# Patient Record
Sex: MALE | Race: WHITE | NOT HISPANIC OR LATINO | Employment: FULL TIME | URBAN - METROPOLITAN AREA
[De-identification: names, ages, dates, MRNs, and addresses within clinical notes are randomized per-mention and may not be internally consistent; named-entity substitution may affect disease eponyms.]

---

## 2020-07-04 ENCOUNTER — OFFICE VISIT (OUTPATIENT)
Dept: URGENT CARE | Facility: CLINIC | Age: 40
End: 2020-07-04
Payer: OTHER GOVERNMENT

## 2020-07-04 VITALS — DIASTOLIC BLOOD PRESSURE: 85 MMHG | OXYGEN SATURATION: 99 % | HEART RATE: 62 BPM | SYSTOLIC BLOOD PRESSURE: 120 MMHG

## 2020-07-04 DIAGNOSIS — R05.9 COUGH: ICD-10-CM

## 2020-07-04 DIAGNOSIS — B34.9 VIRAL SYNDROME: Primary | ICD-10-CM

## 2020-07-04 DIAGNOSIS — R06.02 SHORTNESS OF BREATH: ICD-10-CM

## 2020-07-04 DIAGNOSIS — J02.9 SORE THROAT: ICD-10-CM

## 2020-07-04 PROCEDURE — 99214 OFFICE O/P EST MOD 30 MIN: CPT | Mod: S$GLB,,, | Performed by: NURSE PRACTITIONER

## 2020-07-04 PROCEDURE — U0003 INFECTIOUS AGENT DETECTION BY NUCLEIC ACID (DNA OR RNA); SEVERE ACUTE RESPIRATORY SYNDROME CORONAVIRUS 2 (SARS-COV-2) (CORONAVIRUS DISEASE [COVID-19]), AMPLIFIED PROBE TECHNIQUE, MAKING USE OF HIGH THROUGHPUT TECHNOLOGIES AS DESCRIBED BY CMS-2020-01-R: HCPCS

## 2020-07-04 PROCEDURE — 99214 PR OFFICE/OUTPT VISIT, EST, LEVL IV, 30-39 MIN: ICD-10-PCS | Mod: S$GLB,,, | Performed by: NURSE PRACTITIONER

## 2020-07-04 RX ORDER — ALBUTEROL SULFATE 90 UG/1
2 AEROSOL, METERED RESPIRATORY (INHALATION) EVERY 6 HOURS PRN
Qty: 1 G | Refills: 0 | Status: SHIPPED | OUTPATIENT
Start: 2020-07-04 | End: 2020-07-11

## 2020-07-04 RX ORDER — BENZONATATE 100 MG/1
100 CAPSULE ORAL 3 TIMES DAILY PRN
Qty: 21 CAPSULE | Refills: 0 | Status: SHIPPED | OUTPATIENT
Start: 2020-07-04 | End: 2020-07-11

## 2020-07-04 RX ORDER — PROMETHAZINE HYDROCHLORIDE AND DEXTROMETHORPHAN HYDROBROMIDE 6.25; 15 MG/5ML; MG/5ML
5 SYRUP ORAL EVERY 6 HOURS PRN
Qty: 100 ML | Refills: 0 | Status: SHIPPED | OUTPATIENT
Start: 2020-07-04 | End: 2020-07-11

## 2020-07-04 NOTE — LETTER
July 4, 2020      [facility] [address], [address]  [place] [address]  Phone: [phone]  Fax: [phone]       Patient: Juma Cloud   YOB: 1980  Date of Visit: 07/04/2020    To Whom It May Concern:    Carolin Cloud  was at [facility] on 07/04/2020. The patient is being tested for COVID-19. If he/she is negative, he/she may return to work/school when he/she is symptom free without fever reducing meds in 24 hours-with a surgical mask for 10 days. If the patient is positive, he/she can return to work three days after improved symptoms and no fever without fever reducing meds - the patient has to be out for a minimum of 7 days. We will reswab any employee WHO HAS PREVIOUSLY TESTED POSITIVE FOR COVID, and their employer is requiring a negative test for them to return to work; it must be at least 90 days from your last test. If you have any questions or concerns, or if I can be of further assistance, please do not hesitate to contact me.    Sincerely,    Lila Ott NP

## 2020-07-04 NOTE — PROGRESS NOTES
Subjective:       Patient ID: Juma Cloud is a 39 y.o. male.    Vitals:  blood pressure is 120/85 and his pulse is 62. His oxygen saturation is 99%.     Chief Complaint: COVID-19 Concerns    Patient hasnt been feeling well since yesterday..scratchy throat, slight SOB, and a light cough.    Sore Throat   This is a new problem. The current episode started yesterday. The problem has been unchanged. Neither side of throat is experiencing more pain than the other. There has been no fever. The pain is at a severity of 0/10. The patient is experiencing no pain. Associated symptoms include coughing and shortness of breath. Pertinent negatives include no abdominal pain, congestion, diarrhea, drooling, ear discharge, ear pain, headaches, hoarse voice, plugged ear sensation, neck pain, stridor, swollen glands, trouble swallowing or vomiting. He has had no exposure to strep or mono. He has tried nothing for the symptoms. The treatment provided no relief.       Constitution: Negative for chills, fatigue and fever.   HENT: Positive for sore throat. Negative for ear pain, ear discharge, drooling, congestion and trouble swallowing.    Neck: Negative for neck pain and painful lymph nodes.   Cardiovascular: Negative for chest pain and leg swelling.   Eyes: Negative for double vision and blurred vision.   Respiratory: Positive for cough and shortness of breath. Negative for stridor.    Gastrointestinal: Negative for abdominal pain, nausea, vomiting and diarrhea.   Genitourinary: Negative for dysuria, frequency and urgency.   Musculoskeletal: Negative for joint pain, joint swelling, muscle cramps and muscle ache.   Skin: Negative for color change, pale and rash.   Allergic/Immunologic: Negative for seasonal allergies.   Neurological: Negative for dizziness, history of vertigo, light-headedness, passing out and headaches.   Hematologic/Lymphatic: Negative for swollen lymph nodes, easy bruising/bleeding and history of blood clots.  Does not bruise/bleed easily.   Psychiatric/Behavioral: Negative for nervous/anxious, sleep disturbance and depression. The patient is not nervous/anxious.        Objective:      Physical Exam   Constitutional: He is oriented to person, place, and time. He appears well-developed. He is cooperative.  Non-toxic appearance. He does not appear ill. No distress.   HENT:   Head: Normocephalic and atraumatic.   Right Ear: Hearing, external ear and ear canal normal. A middle ear effusion is present.   Left Ear: Hearing, external ear and ear canal normal. A middle ear effusion is present.   Nose: Nose normal. No mucosal edema, rhinorrhea or nasal deformity. No epistaxis. Right sinus exhibits no maxillary sinus tenderness and no frontal sinus tenderness. Left sinus exhibits no maxillary sinus tenderness and no frontal sinus tenderness.   Mouth/Throat: Uvula is midline, oropharynx is clear and moist and mucous membranes are normal. No trismus in the jaw. Normal dentition. No uvula swelling. No posterior oropharyngeal erythema.   Eyes: Conjunctivae and lids are normal. Right eye exhibits no discharge. Left eye exhibits no discharge. No scleral icterus.   Neck: Trachea normal, normal range of motion, full passive range of motion without pain and phonation normal. Neck supple.   Cardiovascular: Normal rate, regular rhythm, normal heart sounds and normal pulses.   Pulmonary/Chest: Effort normal and breath sounds normal. No respiratory distress.   Abdominal: Soft. Normal appearance and bowel sounds are normal. He exhibits no distension, no pulsatile midline mass and no mass. There is no abdominal tenderness.   Musculoskeletal: Normal range of motion.         General: No deformity.   Neurological: He is alert and oriented to person, place, and time. He exhibits normal muscle tone. Coordination normal.   Skin: Skin is warm, dry, intact, not diaphoretic and not pale.   Psychiatric: His speech is normal and behavior is normal. Judgment  and thought content normal.   Nursing note and vitals reviewed.        Assessment:       1. Viral syndrome    2. Cough    3. Sore throat    4. Shortness of breath        Plan:         Viral syndrome    Cough  -     COVID-19 Routine Screening  -     Airborne and Contact and Droplet Isolation Status; Standing  -     benzonatate (TESSALON) 100 MG capsule; Take 1 capsule (100 mg total) by mouth 3 (three) times daily as needed for Cough.  Dispense: 21 capsule; Refill: 0  -     promethazine-dextromethorphan (PROMETHAZINE-DM) 6.25-15 mg/5 mL Syrp; Take 5 mLs by mouth every 6 (six) hours as needed (May take every 4 hours, PRN. DoNOT take more than 30 mL in 24 hours.).  Dispense: 100 mL; Refill: 0  -     albuterol (PROVENTIL HFA) 90 mcg/actuation inhaler; Inhale 2 puffs into the lungs every 6 (six) hours as needed for Wheezing. Rescue  Dispense: 1 g; Refill: 0    Sore throat  -     Cancel: POCT Strep A, Molecular    Shortness of breath          Patient Instructions     Always follow your healthcare professional's instructions.    You have received urgent care diagnosis and treatment and you may be released before all of your medical problems are known or treated. Unless you have been given a referral, you (the patient), will arrange for follow-up care as instructed.     If you have been given a referral, [facility] will contact you (their phone number is [phone]). If they do NOT call you by the end of the business day, please call them the following day.    You have been diagnosed with Viral Syndrome. TREATMENT: YOUR TREATMENT IS AIMED AT SYMPTOM MANAGEMENT.       A viral illness may cause a number of symptoms. The symptoms depend on the part of the body that the virus affects. If it settles in your nose, throat, and lungs, it may cause cough, sore throat, congestion, and sometimes headache. If it settles in your stomach and intestinal tract, it may cause vomiting and diarrhea. Sometimes it causes vague symptoms like  ""aching all over," feeling tired, loss of appetite, or fever. A viral illness usually lasts 1 to 2 weeks, but sometimes it lasts longer. In some cases, a more serious infection can look like a viral syndrome in the first few days of the illness; therefore, you may need another exam and additional tests to know the difference.          Home care  Follow these guidelines for taking care of yourself at home:  · If symptoms are severe, rest at home for the first 2 to 3 days.  · Stay away from cigarette smoke - both your smoke and the smoke from others.  · You may use over-the-counter acetaminophen or ibuprofen for fever, muscle aching, and headache, unless another medicine was prescribed for this. If you have chronic liver or kidney disease or ever had a stomach ulcer or GI bleeding, talk with your doctor before using these medicines. No one who is younger than 18 and ill with a fever should take aspirin. It may cause severe disease or death.  · Your appetite may be poor, so a light diet is fine. Avoid dehydration by drinking 8 to 12 8-ounce glasses of fluids each day. This may include water; orange juice; lemonade; apple, grape, and cranberry juice; clear fruit drinks; electrolyte replacement and sports drinks; and decaffeinated teas and coffee. If you have been diagnosed with a kidney disease, ask your doctor how much and what types of fluids you should drink to prevent dehydration. If you have kidney disease, drinking too much fluid can cause it build up in the your body and be dangerous to your health.  · Over-the-counter remedies won't shorten the length of the illness but may be helpful for cough, sore throat; and nasal and sinus congestion. Don't use decongestants if you have high blood pressure.    Follow-up care  Follow up with your healthcare provider if your symptoms continue for more than 7 days.    Fever Cough Body Aches Nausea and Vomiting Diarrhea Congestion or Runny Nose    OTC Tylenol   OTC NSAIDs " " Tessalon Pearls   Phenergan DM   OTC cough medications  Mobic   OTC Tylenol   OTC NSAIDs  Zofran   Phenergan   OTC Emetrol  DO NOT take ant-diarrheal medications  OTC Claritin, Zyrtec, Allegra, OR Xyzal   OTC Flonase   OTC Benadryl      Cough Allergy Symptoms Asthma Headache or Body Aches   Tessalon Perles are a non-narcotic cough medicine. It works by numbing the throat and lungs, making the cough reflex less active, it is used to relieve coughing during the day. If you have been given Phenergan DM cough syrup, you do NOT need to take both medications at the same time.    Phenergan DM is a combination medication that is used to treat cough. Phenergan DM works like an antihistamine and cough suppressant. This medication will make you sleepy like Benadryl, have a drying effect, and act on a part of the brain (cough center) to reduce the need to cough. DO NOT take Benadryl and Phenergan together. You may take over-the-counter claritin, zyrtec, allegra, or xyzal as directed, these are antihistamines that will work to dry up secretions/mucus. Antihistamines work to block the effects of a certain natural substance (histamine), which causes allergy symptoms.    OR    Use over-the-counter Flonase (a nasal steroid) or prescribed Azelastine (a nasal antihistamine) to treat runny nose, sneezing, itchy nose, nasal congestion, and postnasal drip.    Proper administration is to "look down at your toes and aim for your nose". The goal is to aim for your nasolabial folds, the creases in your nose. If you feel the medication drip down your throat, you have NOT administered it correctly. If you can "smell the roses" (floral scent), then you have administered it correctly. If you have a history of asthma, expressed a concern about wheezing or have been told you were wheezing during your exam today, you are being given Albuterol. Albuterol is a bronchodilator that relaxes muscles in the airways and increases air flow to " the lungs; it is used to treat or prevent bronchospasm, or narrowing of the airways in the lungs.     If you have a history of asthma, expressed a concern about wheezing or have been told you were wheezing during your exam today and are NOT being prescribed Albuterol that is because you have insured me that you have an adequate supply of the drug at home.    Mobic is a nonsteroidal anti-inflammatory drug (NSAID), it is used to treat inflammation. It reduces pain, swelling, and stiffness of the joints. Please do NOT take any other NSAID medications while on this medication, you can take Tylenol if you feel the need. If you were not prescribed an anti-inflammatory medication, and if you do not have any history of stomach/intestinal ulcers, or kidney disease, or are not taking a blood thinner such as Coumadin, Plavix, Pradaxa, Eloquis, or Xaralta for example, it is OK to take over the counter Ibuprofen or Advil or Motrin or Aleve as directed.  Do not take any of these medications on an empty stomach.         Dehydration (Adult)  Dehydration occurs when your body loses too much fluid. This may be the result of prolonged vomiting or diarrhea, excessive sweating, or a high fever. It may also happen if you don't drink enough fluid when you're sick or out in the heat. Misuse of diuretics (water pills) can also be a cause.  Symptoms include thirst and decreased urine output. You may also feel dizzy, weak, fatigued, or very drowsy. The diet described below is usually enough to treat dehydration. In some cases, you may need medicine.    Home care  · Drink at least 12 8-ounce glasses of fluid every day to resolve the dehydration. Fluid may include water; orange juice; lemonade; apple, grape, or cranberry juice; clear fruit drinks; electrolyte replacement and sports drinks; and teas and coffee without caffeine. If you have been diagnosed with a kidney disease, ask your doctor how much and what types of fluids you should drink to  prevent dehydration. If you have kidney disease, fluid can build up in the body. This can be dangerous to your health.  · If you have a fever, muscle aches, or a headache as a result of a cold or flu, you may take acetaminophen or ibuprofen, unless another medicine was prescribed. If you have chronic liver or kidney disease, or have ever had a stomach ulcer or gastrointestinal bleeding, talk with your health care provider before using these medicines. Don't take aspirin if you are younger than 18 and have a fever. Aspirin raises the chance for severe liver injury.    When to seek medical advice  DEHYDRATION:  · Continued vomiting  · Frequent diarrhea (more than 5 times a day); blood (red or black color) or mucus in diarrhea  · Blood in vomit or stool  · Swollen abdomen or increasing abdominal pain  · Weakness, dizziness, or fainting  · Unusual drowsiness or confusion  · Reduced urine output or extreme thirst    FEVER:  Call your healthcare provider right away if any of these occur:  · Your child is 3 months old or younger and has a fever of 100.4°F (38°C) or higher. Get medical care right away. Fever in a young baby can be a sign of a dangerous infection.  · Your child is of any age and has repeated fevers above 104°F (40°C).  · Your child is younger than 2 years of age and a fever of 100.4°F (38°C) continues for more than 1 day.  · A fever of 100.4°F (38°C) for more than 3 days in anyone older than 2 years of age.       Why didn't I get a steroid today?    The benefits are small and, unlike topical glucocorticoids, systemic glucocorticoids possess a significant side effect profile. Major side effects include:    · Effects immunity predisposing you to getting a more severe infection and increases your white blood cell count. You have the flu, you do not need anything to weaken your immune system right now.  · Young children -- Growth impairment   · Skin consequences: Skin thinning and ecchymoses, Cushingoid  "appearance (rounded face), acne, weight gain, mild hirsutism, facial erythema, and striae.  · Eye consequences: Cataracts, increased intraocular pressure, exophthalmos.   · Cardiovascular consequences: Fluid retention, premature atherosclerotic disease, and arrhythmias.   · GI consequences: Increased risk for adverse gastrointestinal effects, such as gastritis, ulcer formation, and gastrointestinal bleeding  · Bone and muscle consequences: Osteoporosis, osteonecrosis, and myopathy.  · Neuropsychiatric effects: Mood disorders, psychosis, memory impairment, and   · Metabolic and Endocrine consequences: Suppress the hypothalamic-pituitary-adrenal (HPA) axis and increase blood sugar.     Can I just have a shot instead of pills?  No. If you are sick, you need a course of treatment (not just a one time dose).     Why didn't I get an antibiotic today?  You have been diagnosed with a virus an antibiotic will not help you. Antibiotics work by destroying the cell walls of bacteria, viruses do not have cell walls.     When to seek medical advice:    DEHYDRATION:  · Continued vomiting  · Frequent diarrhea (more than 5 times a day); blood (red or black color) or mucus in diarrhea  · Blood in vomit or stool  · Swollen abdomen or increasing abdominal pain  · Reduced urine output or extreme thirst  · Repeated vomiting after the first 4 hours on fluids  · Occasional vomiting for more than 48 hours  · Frequent diarrhea (more than 5 times a day), blood in diarrhea (red or black color), or mucus in diarrhea  · Blood in vomit or stool  · Swollen abdomen or signs of abdominal pain  · No urine for 8 hours, no tears when crying, "sunken" eyes, or dry mouth  · Unusual behavior changes, fussiness, drowsiness, confusion, or seizure  · Fever (see Fever and children, below)  FEVER:  Call your healthcare provider right away if any of these occur:  · Your child is 3 months old or younger and has a fever of 100.4°F (38°C) or higher. Get medical " "care right away. Fever in a young baby can be a sign of a dangerous infection.  · Your child is of any age and has repeated fevers above 104°F (40°C).  · Your child is younger than 2 years of age and a fever of 100.4°F (38°C) continues for more than 1 day.  · A fever of 100.4°F (38°C) for more than 3 days in anyone older than 2 years of age.  Call 911  Call 911 or your local emergency services number if the child shows any of these symptoms or signs:  · Trouble breathing  · Confusion  · Is very drowsy or difficult to awaken  · Fainting or loss of consciousness  · Rapid heart rate  · Seizure  · Stiff neck     You have NOT tested positive for the flu; your symptoms are consistent with COVID-19. There is no current "cure" for COVID-19; rather, like the flu, treatment is geared at symptom management.    COVID-19     Viral infections are spread through the air in droplets when someone who has a virus breaths, coughs, sneezes, laughs, or talks.     You have been tested for COVID-19 in this clinic today.     Guidelines for self-quarantine:    · Stay home: People who are mildly ill with COVID-19 are able to recover at home. Do not leave, except to get medical care. Do not visit public areas.  · Call ahead: If you have a medical appointment, call your doctors office or emergency department and tell them you may have COVID-19. This will help the office protect themselves and other patients. Let them know how long your symptoms have been present and that you did NOT have a positive test result for the flu.  · Avoid public transportation: Do NOT use public transportation, ride-sharing, taxis, or flying.  · Stay away from others: As much as possible, you should keep a distance of at least 6 feet from your self and others. Stay in a specific sick room and away from other people in your home. Use a separate bathroom, if available.  · When you must go out (for healthcare purposes), you should wear a facemask.  · Wash hands: Wash " your hands often with soap and water for at least 20 seconds. This is especially important after blowing your nose, coughing, or sneezing; going to the bathroom; and before eating or preparing food.  Hand : If soap and water are not available, use an alcohol-based hand  with at least 60% alcohol, covering all surfaces of your hands and rubbing them together until they feel dry.  Soap and water: Soap and water are the best option, especially if hands are visibly dirty.  · Avoid touching: Avoid touching your eyes, nose, and mouth with unwashed hands.  · You can discontinue your self-quarantine when 14 days have elapse AND you have NOT had fever for at least 72 hours (that is three full days of no fever without the use medicine that reduces fevers) AND other symptoms have improved (for example, when your cough or shortness of breath have improved).    How is COVID-19 treated?  Not all patients with COVID-19 will require medical supportive care. Clinical management for hospitalized patients with COVID-19 is focused on supportive care of complications, including advanced organ support for respiratory failure, septic shock, and multi-organ failure. There are currently no antiviral drugs licensed by the [place] Food and Drug Administration (FDA) to treat COVID-19.     Your provider discussed your plan of care with you during your physical exam. It was reviewed once more by the provider when giving you an after visit summary. If the patient is a minor, the discharge instructions were discussed with an adult and that adult acknowledge their understanding of the provider's teaching.

## 2020-07-04 NOTE — PATIENT INSTRUCTIONS
"Always follow your healthcare professional's instructions.    You have received urgent care diagnosis and treatment and you may be released before all of your medical problems are known or treated. Unless you have been given a referral, you (the patient), will arrange for follow-up care as instructed.     If you have been given a referral, [facility] will contact you (their phone number is [phone]). If they do NOT call you by the end of the business day, please call them the following day.    You have been diagnosed with Viral Syndrome. TREATMENT: YOUR TREATMENT IS AIMED AT SYMPTOM MANAGEMENT.       A viral illness may cause a number of symptoms. The symptoms depend on the part of the body that the virus affects. If it settles in your nose, throat, and lungs, it may cause cough, sore throat, congestion, and sometimes headache. If it settles in your stomach and intestinal tract, it may cause vomiting and diarrhea. Sometimes it causes vague symptoms like "aching all over," feeling tired, loss of appetite, or fever. A viral illness usually lasts 1 to 2 weeks, but sometimes it lasts longer. In some cases, a more serious infection can look like a viral syndrome in the first few days of the illness; therefore, you may need another exam and additional tests to know the difference.          Home care  Follow these guidelines for taking care of yourself at home:  · If symptoms are severe, rest at home for the first 2 to 3 days.  · Stay away from cigarette smoke - both your smoke and the smoke from others.  · You may use over-the-counter acetaminophen or ibuprofen for fever, muscle aching, and headache, unless another medicine was prescribed for this. If you have chronic liver or kidney disease or ever had a stomach ulcer or GI bleeding, talk with your doctor before using these medicines. No one who is younger than 18 and ill with a fever should take aspirin. It may cause severe disease or death.  · Your appetite may be " poor, so a light diet is fine. Avoid dehydration by drinking 8 to 12 8-ounce glasses of fluids each day. This may include water; orange juice; lemonade; apple, grape, and cranberry juice; clear fruit drinks; electrolyte replacement and sports drinks; and decaffeinated teas and coffee. If you have been diagnosed with a kidney disease, ask your doctor how much and what types of fluids you should drink to prevent dehydration. If you have kidney disease, drinking too much fluid can cause it build up in the your body and be dangerous to your health.  · Over-the-counter remedies won't shorten the length of the illness but may be helpful for cough, sore throat; and nasal and sinus congestion. Don't use decongestants if you have high blood pressure.    Follow-up care  Follow up with your healthcare provider if your symptoms continue for more than 7 days.    Fever Cough Body Aches Nausea and Vomiting Diarrhea Congestion or Runny Nose    OTC Tylenol   OTC NSAIDs  Tessalon Pearls   Phenergan DM   OTC cough medications  Mobic   OTC Tylenol   OTC NSAIDs  Zofran   Phenergan   OTC Emetrol  DO NOT take ant-diarrheal medications  OTC Claritin, Zyrtec, Allegra, OR Xyzal   OTC Flonase   OTC Benadryl      Cough Allergy Symptoms Asthma Headache or Body Aches   Tessalon Perles are a non-narcotic cough medicine. It works by numbing the throat and lungs, making the cough reflex less active, it is used to relieve coughing during the day. If you have been given Phenergan DM cough syrup, you do NOT need to take both medications at the same time.    Phenergan DM is a combination medication that is used to treat cough. Phenergan DM works like an antihistamine and cough suppressant. This medication will make you sleepy like Benadryl, have a drying effect, and act on a part of the brain (cough center) to reduce the need to cough. DO NOT take Benadryl and Phenergan together. You may take over-the-counter claritin, zyrtec, allegra, or  "xyzal as directed, these are antihistamines that will work to dry up secretions/mucus. Antihistamines work to block the effects of a certain natural substance (histamine), which causes allergy symptoms.    OR    Use over-the-counter Flonase (a nasal steroid) or prescribed Azelastine (a nasal antihistamine) to treat runny nose, sneezing, itchy nose, nasal congestion, and postnasal drip.    Proper administration is to "look down at your toes and aim for your nose". The goal is to aim for your nasolabial folds, the creases in your nose. If you feel the medication drip down your throat, you have NOT administered it correctly. If you can "smell the roses" (floral scent), then you have administered it correctly. If you have a history of asthma, expressed a concern about wheezing or have been told you were wheezing during your exam today, you are being given Albuterol. Albuterol is a bronchodilator that relaxes muscles in the airways and increases air flow to the lungs; it is used to treat or prevent bronchospasm, or narrowing of the airways in the lungs.     If you have a history of asthma, expressed a concern about wheezing or have been told you were wheezing during your exam today and are NOT being prescribed Albuterol that is because you have insured me that you have an adequate supply of the drug at home.    Mobic is a nonsteroidal anti-inflammatory drug (NSAID), it is used to treat inflammation. It reduces pain, swelling, and stiffness of the joints. Please do NOT take any other NSAID medications while on this medication, you can take Tylenol if you feel the need. If you were not prescribed an anti-inflammatory medication, and if you do not have any history of stomach/intestinal ulcers, or kidney disease, or are not taking a blood thinner such as Coumadin, Plavix, Pradaxa, Eloquis, or Xaralta for example, it is OK to take over the counter Ibuprofen or Advil or Motrin or Aleve as directed.  Do not take any of these " medications on an empty stomach.         Dehydration (Adult)  Dehydration occurs when your body loses too much fluid. This may be the result of prolonged vomiting or diarrhea, excessive sweating, or a high fever. It may also happen if you don't drink enough fluid when you're sick or out in the heat. Misuse of diuretics (water pills) can also be a cause.  Symptoms include thirst and decreased urine output. You may also feel dizzy, weak, fatigued, or very drowsy. The diet described below is usually enough to treat dehydration. In some cases, you may need medicine.    Home care  · Drink at least 12 8-ounce glasses of fluid every day to resolve the dehydration. Fluid may include water; orange juice; lemonade; apple, grape, or cranberry juice; clear fruit drinks; electrolyte replacement and sports drinks; and teas and coffee without caffeine. If you have been diagnosed with a kidney disease, ask your doctor how much and what types of fluids you should drink to prevent dehydration. If you have kidney disease, fluid can build up in the body. This can be dangerous to your health.  · If you have a fever, muscle aches, or a headache as a result of a cold or flu, you may take acetaminophen or ibuprofen, unless another medicine was prescribed. If you have chronic liver or kidney disease, or have ever had a stomach ulcer or gastrointestinal bleeding, talk with your health care provider before using these medicines. Don't take aspirin if you are younger than 18 and have a fever. Aspirin raises the chance for severe liver injury.    When to seek medical advice  DEHYDRATION:  · Continued vomiting  · Frequent diarrhea (more than 5 times a day); blood (red or black color) or mucus in diarrhea  · Blood in vomit or stool  · Swollen abdomen or increasing abdominal pain  · Weakness, dizziness, or fainting  · Unusual drowsiness or confusion  · Reduced urine output or extreme thirst    FEVER:  Call your healthcare provider right away if  any of these occur:  · Your child is 3 months old or younger and has a fever of 100.4°F (38°C) or higher. Get medical care right away. Fever in a young baby can be a sign of a dangerous infection.  · Your child is of any age and has repeated fevers above 104°F (40°C).  · Your child is younger than 2 years of age and a fever of 100.4°F (38°C) continues for more than 1 day.  · A fever of 100.4°F (38°C) for more than 3 days in anyone older than 2 years of age.       Why didn't I get a steroid today?    The benefits are small and, unlike topical glucocorticoids, systemic glucocorticoids possess a significant side effect profile. Major side effects include:    · Effects immunity predisposing you to getting a more severe infection and increases your white blood cell count. You have the flu, you do not need anything to weaken your immune system right now.  · Young children -- Growth impairment   · Skin consequences: Skin thinning and ecchymoses, Cushingoid appearance (rounded face), acne, weight gain, mild hirsutism, facial erythema, and striae.  · Eye consequences: Cataracts, increased intraocular pressure, exophthalmos.   · Cardiovascular consequences: Fluid retention, premature atherosclerotic disease, and arrhythmias.   · GI consequences: Increased risk for adverse gastrointestinal effects, such as gastritis, ulcer formation, and gastrointestinal bleeding  · Bone and muscle consequences: Osteoporosis, osteonecrosis, and myopathy.  · Neuropsychiatric effects: Mood disorders, psychosis, memory impairment, and   · Metabolic and Endocrine consequences: Suppress the hypothalamic-pituitary-adrenal (HPA) axis and increase blood sugar.     Can I just have a shot instead of pills?  No. If you are sick, you need a course of treatment (not just a one time dose).     Why didn't I get an antibiotic today?  You have been diagnosed with a virus an antibiotic will not help you. Antibiotics work by destroying the cell walls of  "bacteria, viruses do not have cell walls.     When to seek medical advice:    DEHYDRATION:  · Continued vomiting  · Frequent diarrhea (more than 5 times a day); blood (red or black color) or mucus in diarrhea  · Blood in vomit or stool  · Swollen abdomen or increasing abdominal pain  · Reduced urine output or extreme thirst  · Repeated vomiting after the first 4 hours on fluids  · Occasional vomiting for more than 48 hours  · Frequent diarrhea (more than 5 times a day), blood in diarrhea (red or black color), or mucus in diarrhea  · Blood in vomit or stool  · Swollen abdomen or signs of abdominal pain  · No urine for 8 hours, no tears when crying, "sunken" eyes, or dry mouth  · Unusual behavior changes, fussiness, drowsiness, confusion, or seizure  · Fever (see Fever and children, below)  FEVER:  Call your healthcare provider right away if any of these occur:  · Your child is 3 months old or younger and has a fever of 100.4°F (38°C) or higher. Get medical care right away. Fever in a young baby can be a sign of a dangerous infection.  · Your child is of any age and has repeated fevers above 104°F (40°C).  · Your child is younger than 2 years of age and a fever of 100.4°F (38°C) continues for more than 1 day.  · A fever of 100.4°F (38°C) for more than 3 days in anyone older than 2 years of age.  Call 911  Call 911 or your local emergency services number if the child shows any of these symptoms or signs:  · Trouble breathing  · Confusion  · Is very drowsy or difficult to awaken  · Fainting or loss of consciousness  · Rapid heart rate  · Seizure  · Stiff neck     You have NOT tested positive for the flu; your symptoms are consistent with COVID-19. There is no current "cure" for COVID-19; rather, like the flu, treatment is geared at symptom management.    COVID-19     Viral infections are spread through the air in droplets when someone who has a virus breaths, coughs, sneezes, laughs, or talks.     You have been tested " for COVID-19 in this clinic today.     Guidelines for self-quarantine:    · Stay home: People who are mildly ill with COVID-19 are able to recover at home. Do not leave, except to get medical care. Do not visit public areas.  · Call ahead: If you have a medical appointment, call your doctors office or emergency department and tell them you may have COVID-19. This will help the office protect themselves and other patients. Let them know how long your symptoms have been present and that you did NOT have a positive test result for the flu.  · Avoid public transportation: Do NOT use public transportation, ride-sharing, taxis, or flying.  · Stay away from others: As much as possible, you should keep a distance of at least 6 feet from your self and others. Stay in a specific sick room and away from other people in your home. Use a separate bathroom, if available.  · When you must go out (for healthcare purposes), you should wear a facemask.  · Wash hands: Wash your hands often with soap and water for at least 20 seconds. This is especially important after blowing your nose, coughing, or sneezing; going to the bathroom; and before eating or preparing food.  Hand : If soap and water are not available, use an alcohol-based hand  with at least 60% alcohol, covering all surfaces of your hands and rubbing them together until they feel dry.  Soap and water: Soap and water are the best option, especially if hands are visibly dirty.  · Avoid touching: Avoid touching your eyes, nose, and mouth with unwashed hands.  · You can discontinue your self-quarantine when 14 days have elapse AND you have NOT had fever for at least 72 hours (that is three full days of no fever without the use medicine that reduces fevers) AND other symptoms have improved (for example, when your cough or shortness of breath have improved).    How is COVID-19 treated?  Not all patients with COVID-19 will require medical supportive care.  Clinical management for hospitalized patients with COVID-19 is focused on supportive care of complications, including advanced organ support for respiratory failure, septic shock, and multi-organ failure. There are currently no antiviral drugs licensed by the [place] Food and Drug Administration (FDA) to treat COVID-19.     Your provider discussed your plan of care with you during your physical exam. It was reviewed once more by the provider when giving you an after visit summary. If the patient is a minor, the discharge instructions were discussed with an adult and that adult acknowledge their understanding of the provider's teaching.

## 2020-07-07 LAB — SARS-COV-2 RNA RESP QL NAA+PROBE: NOT DETECTED

## 2021-05-06 ENCOUNTER — PATIENT MESSAGE (OUTPATIENT)
Dept: RESEARCH | Facility: HOSPITAL | Age: 41
End: 2021-05-06